# Patient Record
Sex: MALE | Race: WHITE | Employment: FULL TIME | URBAN - METROPOLITAN AREA
[De-identification: names, ages, dates, MRNs, and addresses within clinical notes are randomized per-mention and may not be internally consistent; named-entity substitution may affect disease eponyms.]

---

## 2019-05-01 ENCOUNTER — HOSPITAL ENCOUNTER (OUTPATIENT)
Dept: PREADMISSION TESTING | Age: 64
Discharge: HOME | End: 2019-05-01
Payer: COMMERCIAL

## 2019-05-01 VITALS
HEART RATE: 48 BPM | HEIGHT: 68 IN | DIASTOLIC BLOOD PRESSURE: 81 MMHG | TEMPERATURE: 97.5 F | WEIGHT: 160 LBS | BODY MASS INDEX: 24.25 KG/M2 | SYSTOLIC BLOOD PRESSURE: 125 MMHG

## 2019-05-01 LAB
ATRIAL RATE: 47 BPM
CALCULATED P AXIS, ECG09: 50 DEGREES
CALCULATED R AXIS, ECG10: 67 DEGREES
CALCULATED T AXIS, ECG11: 46 DEGREES
DIAGNOSIS, 93000: NORMAL
HGB BLD-MCNC: 13.9 G/DL (ref 12.1–17)
P-R INTERVAL, ECG05: 164 MS
Q-T INTERVAL, ECG07: 458 MS
QRS DURATION, ECG06: 86 MS
QTC CALCULATION (BEZET), ECG08: 405 MS
VENTRICULAR RATE, ECG03: 47 BPM

## 2019-05-01 PROCEDURE — 93005 ELECTROCARDIOGRAM TRACING: CPT

## 2019-05-01 PROCEDURE — 36415 COLL VENOUS BLD VENIPUNCTURE: CPT

## 2019-05-01 PROCEDURE — 85018 HEMOGLOBIN: CPT

## 2019-05-01 RX ORDER — HYDROCORTISONE ACETATE 1 %
CREAM (GRAM) TOPICAL DAILY
COMMUNITY

## 2019-05-01 RX ORDER — BISMUTH SUBSALICYLATE 262 MG
1 TABLET,CHEWABLE ORAL DAILY
COMMUNITY

## 2019-05-01 RX ORDER — CHOLECALCIFEROL (VITAMIN D3) 125 MCG
CAPSULE ORAL AS NEEDED
COMMUNITY

## 2019-05-01 NOTE — PERIOP NOTES
PREOPERATIVE INSTRUCTIONS REVIEWED WITH PATIENT. PATIENT GIVEN TWO-- SIX PACKS OF CHG WIPES. INSTRUCTIONS REVIEWED ON USE OF CHG WIPES. PATIENT GIVEN SSI INFECTIONS SHEET. PATIENT WAS GIVEN THE OPPORTUNITY TO ASK QUESTIONS ON THE INFORMATION PROVIDED. PATIENT ADVISED THAT THEY MAY DRINK CLEAR LIQUIDS (12 OZ/4 HOURS) UP UNTIL ONE HOUR PRIOR TO ARRIVAL DAY OF SURGERY.

## 2019-05-03 PROBLEM — S46.011A TRAUMATIC COMPLETE TEAR OF RIGHT ROTATOR CUFF: Status: ACTIVE | Noted: 2019-05-03

## 2019-05-03 NOTE — H&P
Chief Complaint: Follow-up of the Right Shoulder 
  
Jennifer Manjarrez comes in to the clinic today for an follow-up of his right shoulder. Patient had a fall while running where he attempted to catch himself resulting in him landing directly on his right shoulder, which occurred roughly 5-6 weeks ago. He was initially seen at [facility] for [facility] and he was evaluated to have shoulder sprain and a rotator cuff tendinitis. He was placed in to an arm sling and advise to work on home conservative measures. On follow-up evaluation he persisted progressing pain and noticeable weakness to the right shoulder. We are concerned for a rotator cuff tear so we sent him for a Mri. He is here today to review his results. He continues to have quite severe pain to the shoulder. He continues to have quite severe pain to the shoulder. No recent injuries were noted. 
  
  
Review of Systems 4/30/2019 
  
Constitutional: Unexplained: Negative Genitourinary: Frequent Urination: Negative HEENT: Vision Loss: Negative Neurological: Memory Loss: Negative Integumentary: Rash: Negative Cardiovascular: Palpatations: Negative Hematologic: Bruises/Bleeds Easily: Negative Gastrointestinal: Constipation: Negative Immunological: Seasonal Allergies: Positive Musculoskeletal: Joint Pain: Positive 
  
Medical History Past Medical History:  
Diagnosis Date  no relevant past medical history    
  
  
  
Surgical History Past Surgical History:  
Procedure Laterality Date  NO RELEVANT ORTHOPAEDIC SURGERIES      
 NO RELEVANT SURGERIES      
  
  
  
Objective:  
  
There were no vitals filed for this visit. 
  
Constitutional:  No acute distress. Well nourished. Well developed. Eyes:  Sclera are nonicteric. Respiratory:  No labored breathing. Cardiovascular:  No marked edema. Skin:  No marked skin ulcers. Neurological:  No marked sensory loss noted. Psychiatric: Alert and oriented x3.  
Musculoskeletal  
  
 On exam of the right shoulder reveals elevation to 158 degrees with marked crepitus and tenderness to the subacromial space. Weakness to external rotation. No neck pain. No numbness or paresthesias in the hands. No effusion. No sign of infection. No cellulitis or rash. I detect no motor or sensory deficits in the upper extremities. The neurovascular status of the upper extremities is intact. 
  
  
Imaging/Studies:  
I personally review the Mri of the right shoulder taken on 4/27/19.  
  
Impression: 1. Full-thickness mildly retracted supraspinatus tear. No fatty muscle 
atrophy. 2. Completely torn and retracted subscapularis tear. No fatty muscle 
atrophy. 3. Infraspinatus tendinopathy and small laminar intrasubstance tear. 4. Thickened and medially subluxed long head of the biceps tendon. 5. Moderate glenohumeral osteoarthritis. 6. Moderate acromioclavicular degenerative changes. 7. Moderate subacromial/subdeltoid bursitis. 
  
Assessment:  
There is no problem list on file for this patient. 
  
  
    ICD-10-CM 1. Complete tear of right rotator cuff M75.121  
2. Tear of right biceps muscle, initial encounter I86.702K 3. 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  
  
  
  
Plan: I reviewed my findings and the Mri with the patient and wife. The Mri revealed a complete tear of the rotator cuff with retraction. It also revealed biceps pathology with underlying chondromalacia. I showed the patient and wife a poster diagram to review the pathophysiology.  
  
We discussed treatment options. Overall, the patient has failed conservative measures. From the findings of his Mri and physical exam, I explained that the only treatment modality that would provide him long term symptomatic relief would be surgical intervention. I explained that he would right shoulder arthroscopy with subacromial decompression.  We would evaluate the rotator cuff and he would most likely require mini open repair. I explained that we would also evaluate the biceps tendon and that he may require biceps tenotomy and/or tenodesis. He was in agreement with the proposed plan.  
  
I explained the procedure of arthroscopy and mini open rotator cuff repair. I explained the hospitalization, post-op and rehabilitation for the procedure. The patient is aware of all complications associated with the surgery, including the chance of infection and recurrent tear. He understands that he would be on antibiotics following surgery to prevent infection and that he would undergo a course of post-op physical therapy for rehabilitation. He understands the period of immobilization following rotator cuff repair and the extent of physical therapy following the operation. PROCEDURE:  Right shoulder arthroscopy, subacromial decompression, mini open rotator cuff repair and biceps tenotomy versus tenodesis. Date of Surgery Update: 
Andres Coates was seen and examined. Past Medical History:  
Diagnosis Date  Adverse effect of anesthesia   
 difficulty urinating postop  H/O seasonal allergies  Nausea & vomiting Prior to Admission Medications Prescriptions Last Dose Informant Patient Reported? Taking? OTHER   Yes No  
Sig: CBD OIL; AT BEDTIME AS NEEDED; FOR SHOULDER PAIN. acetaminophen (TYLENOL EXTRA STRENGTH PO)   Yes No  
Sig: Take  by mouth as needed. multivitamin (ONE A DAY) tablet   Yes No  
Sig: Take 1 Tab by mouth daily. naproxen sodium (ALEVE) 220 mg cap   Yes No  
Sig: Take  by mouth as needed. omega-3 fatty acids (FISH OIL CONCENTRATE PO)   Yes No  
Sig: Take 1,200 Units by mouth three (3) times daily. vitamin e (AQUA GEMS) 200 unit capsule   Yes No  
Sig: Take  by mouth daily. Facility-Administered Medications: None Allergy to: Ibuprofen Physical Examination: General appearance - alert, well appearing, and in no distress History and physical has been reviewed. The patient has been examined. There have been no significant clinical changes since the completion of the originally dated History and Physical. 
 
Signed By: Melissa Lewis PA-C  May 8, 2019 7:26 AM

## 2019-05-03 NOTE — PERIOP NOTES
ABN. EKG REVIEWED BY DR WHITT, ANESTH.; OKAY FOR SURGERY. FAXED AND CALLED/ SPOKE WITH [facility] -- DR GREEN/OFFICE.

## 2019-05-08 ENCOUNTER — ANESTHESIA EVENT (OUTPATIENT)
Dept: SURGERY | Age: 64
End: 2019-05-08
Payer: COMMERCIAL

## 2019-05-08 ENCOUNTER — HOSPITAL ENCOUNTER (OUTPATIENT)
Age: 64
Setting detail: OUTPATIENT SURGERY
Discharge: HOME | End: 2019-05-08
Attending: ORTHOPAEDIC SURGERY | Admitting: ORTHOPAEDIC SURGERY
Payer: COMMERCIAL

## 2019-05-08 ENCOUNTER — ANESTHESIA (OUTPATIENT)
Dept: SURGERY | Age: 64
End: 2019-05-08
Payer: COMMERCIAL

## 2019-05-08 VITALS
HEIGHT: 68 IN | TEMPERATURE: 98.5 F | SYSTOLIC BLOOD PRESSURE: 122 MMHG | BODY MASS INDEX: 24.25 KG/M2 | WEIGHT: 160 LBS | DIASTOLIC BLOOD PRESSURE: 62 MMHG | HEART RATE: 68 BPM | RESPIRATION RATE: 16 BRPM | OXYGEN SATURATION: 99 %

## 2019-05-08 DIAGNOSIS — S46.011A TRAUMATIC COMPLETE TEAR OF RIGHT ROTATOR CUFF, INITIAL ENCOUNTER: Primary | ICD-10-CM

## 2019-05-08 LAB
GLUCOSE BLD STRIP.AUTO-MCNC: 94 MG/DL (ref 65–100)
SERVICE CMNT-IMP: NORMAL

## 2019-05-08 PROCEDURE — 77030008684 HC TU ET CUF COVD -B: Performed by: ANESTHESIOLOGY

## 2019-05-08 PROCEDURE — 77030031139 HC SUT VCRL2 J&J -A: Performed by: ORTHOPAEDIC SURGERY

## 2019-05-08 PROCEDURE — 77030011263 HC ELECTRD ACRPLSTY CNMD -B: Performed by: ORTHOPAEDIC SURGERY

## 2019-05-08 PROCEDURE — 74011250636 HC RX REV CODE- 250/636

## 2019-05-08 PROCEDURE — 77030019908 HC STETH ESOPH SIMS -A: Performed by: ANESTHESIOLOGY

## 2019-05-08 PROCEDURE — C1713 ANCHOR/SCREW BN/BN,TIS/BN: HCPCS | Performed by: ORTHOPAEDIC SURGERY

## 2019-05-08 PROCEDURE — 77030011640 HC PAD GRND REM COVD -A: Performed by: ORTHOPAEDIC SURGERY

## 2019-05-08 PROCEDURE — 77030018835 HC SOL IRR LR ICUM -A: Performed by: ORTHOPAEDIC SURGERY

## 2019-05-08 PROCEDURE — 77030003598 HC NDL MULT/FIRE ARTH -C: Performed by: ORTHOPAEDIC SURGERY

## 2019-05-08 PROCEDURE — 76210000026 HC REC RM PH II 1 TO 1.5 HR: Performed by: ORTHOPAEDIC SURGERY

## 2019-05-08 PROCEDURE — 77030008753 HC TU IRR IN/OUT FLO S&N -B: Performed by: ORTHOPAEDIC SURGERY

## 2019-05-08 PROCEDURE — 77030003601 HC NDL NRV BLK BBMI -A

## 2019-05-08 PROCEDURE — 77030006884 HC BLD SHV INCIS S&N -B: Performed by: ORTHOPAEDIC SURGERY

## 2019-05-08 PROCEDURE — 74011250636 HC RX REV CODE- 250/636: Performed by: PHYSICIAN ASSISTANT

## 2019-05-08 PROCEDURE — 74011250636 HC RX REV CODE- 250/636: Performed by: ORTHOPAEDIC SURGERY

## 2019-05-08 PROCEDURE — 74011250637 HC RX REV CODE- 250/637

## 2019-05-08 PROCEDURE — 77030002986 HC SUT PROL J&J -A: Performed by: ORTHOPAEDIC SURGERY

## 2019-05-08 PROCEDURE — 77030004451 HC BUR SHV S&N -B: Performed by: ORTHOPAEDIC SURGERY

## 2019-05-08 PROCEDURE — 76210000016 HC OR PH I REC 1 TO 1.5 HR: Performed by: ORTHOPAEDIC SURGERY

## 2019-05-08 PROCEDURE — 76060000038 HC ANESTHESIA 3.5 TO 4 HR: Performed by: ORTHOPAEDIC SURGERY

## 2019-05-08 PROCEDURE — 77030020782 HC GWN BAIR PAWS FLX 3M -B

## 2019-05-08 PROCEDURE — 77030018788 HC NDL SUT ANCH -A: Performed by: ORTHOPAEDIC SURGERY

## 2019-05-08 PROCEDURE — 76010000134 HC OR TIME 3.5 TO 4 HR: Performed by: ORTHOPAEDIC SURGERY

## 2019-05-08 PROCEDURE — 74011000250 HC RX REV CODE- 250

## 2019-05-08 PROCEDURE — 77030002919 HC SUT FBRTAPE ARTH -B: Performed by: ORTHOPAEDIC SURGERY

## 2019-05-08 PROCEDURE — 82962 GLUCOSE BLOOD TEST: CPT

## 2019-05-08 PROCEDURE — 77030026438 HC STYL ET INTUB CARD -A: Performed by: ANESTHESIOLOGY

## 2019-05-08 PROCEDURE — 74011250636 HC RX REV CODE- 250/636: Performed by: ANESTHESIOLOGY

## 2019-05-08 PROCEDURE — 64415 NJX AA&/STRD BRCH PLXS IMG: CPT | Performed by: ANESTHESIOLOGY

## 2019-05-08 PROCEDURE — 77030002933 HC SUT MCRYL J&J -A: Performed by: ORTHOPAEDIC SURGERY

## 2019-05-08 PROCEDURE — 77030032490 HC SLV COMPR SCD KNE COVD -B: Performed by: ORTHOPAEDIC SURGERY

## 2019-05-08 PROCEDURE — 74011250637 HC RX REV CODE- 250/637: Performed by: ANESTHESIOLOGY

## 2019-05-08 DEVICE — ANCHOR SUTURE BIOCOMP 4.75X19.1 MM SWIVELOCK C: Type: IMPLANTABLE DEVICE | Site: SHOULDER | Status: FUNCTIONAL

## 2019-05-08 RX ORDER — MIDAZOLAM HYDROCHLORIDE 1 MG/ML
1 INJECTION, SOLUTION INTRAMUSCULAR; INTRAVENOUS AS NEEDED
Status: DISCONTINUED | OUTPATIENT
Start: 2019-05-08 | End: 2019-05-08 | Stop reason: HOSPADM

## 2019-05-08 RX ORDER — PROPOFOL 10 MG/ML
INJECTION, EMULSION INTRAVENOUS AS NEEDED
Status: DISCONTINUED | OUTPATIENT
Start: 2019-05-08 | End: 2019-05-08 | Stop reason: HOSPADM

## 2019-05-08 RX ORDER — MIDAZOLAM HYDROCHLORIDE 1 MG/ML
0.5 INJECTION, SOLUTION INTRAMUSCULAR; INTRAVENOUS
Status: CANCELLED | OUTPATIENT
Start: 2019-05-08

## 2019-05-08 RX ORDER — SODIUM CHLORIDE 0.9 % (FLUSH) 0.9 %
5-40 SYRINGE (ML) INJECTION EVERY 8 HOURS
Status: CANCELLED | OUTPATIENT
Start: 2019-05-08

## 2019-05-08 RX ORDER — HYDROMORPHONE HYDROCHLORIDE 2 MG/1
2-4 TABLET ORAL
Qty: 36 TAB | Refills: 0 | Status: SHIPPED | OUTPATIENT
Start: 2019-05-08 | End: 2019-05-15

## 2019-05-08 RX ORDER — LIDOCAINE HYDROCHLORIDE 10 MG/ML
0.1 INJECTION, SOLUTION EPIDURAL; INFILTRATION; INTRACAUDAL; PERINEURAL AS NEEDED
Status: DISCONTINUED | OUTPATIENT
Start: 2019-05-08 | End: 2019-05-08 | Stop reason: HOSPADM

## 2019-05-08 RX ORDER — SODIUM CHLORIDE 0.9 % (FLUSH) 0.9 %
5-40 SYRINGE (ML) INJECTION AS NEEDED
Status: CANCELLED | OUTPATIENT
Start: 2019-05-08

## 2019-05-08 RX ORDER — MIDAZOLAM HYDROCHLORIDE 1 MG/ML
INJECTION, SOLUTION INTRAMUSCULAR; INTRAVENOUS AS NEEDED
Status: DISCONTINUED | OUTPATIENT
Start: 2019-05-08 | End: 2019-05-08 | Stop reason: HOSPADM

## 2019-05-08 RX ORDER — LIDOCAINE HYDROCHLORIDE 20 MG/ML
INJECTION, SOLUTION EPIDURAL; INFILTRATION; INTRACAUDAL; PERINEURAL AS NEEDED
Status: DISCONTINUED | OUTPATIENT
Start: 2019-05-08 | End: 2019-05-08 | Stop reason: HOSPADM

## 2019-05-08 RX ORDER — SODIUM CHLORIDE, SODIUM LACTATE, POTASSIUM CHLORIDE, CALCIUM CHLORIDE 600; 310; 30; 20 MG/100ML; MG/100ML; MG/100ML; MG/100ML
INJECTION, SOLUTION INTRAVENOUS
Status: DISCONTINUED | OUTPATIENT
Start: 2019-05-08 | End: 2019-05-08 | Stop reason: HOSPADM

## 2019-05-08 RX ORDER — ROPIVACAINE HYDROCHLORIDE 2 MG/ML
INJECTION, SOLUTION EPIDURAL; INFILTRATION; PERINEURAL
Status: COMPLETED | OUTPATIENT
Start: 2019-05-08 | End: 2019-05-08

## 2019-05-08 RX ORDER — ONDANSETRON 2 MG/ML
4 INJECTION INTRAMUSCULAR; INTRAVENOUS AS NEEDED
Status: CANCELLED | OUTPATIENT
Start: 2019-05-08

## 2019-05-08 RX ORDER — DIPHENHYDRAMINE HYDROCHLORIDE 50 MG/ML
12.5 INJECTION, SOLUTION INTRAMUSCULAR; INTRAVENOUS AS NEEDED
Status: CANCELLED | OUTPATIENT
Start: 2019-05-08 | End: 2019-05-08

## 2019-05-08 RX ORDER — SODIUM CHLORIDE 0.9 % (FLUSH) 0.9 %
5-40 SYRINGE (ML) INJECTION EVERY 8 HOURS
Status: DISCONTINUED | OUTPATIENT
Start: 2019-05-08 | End: 2019-05-08 | Stop reason: HOSPADM

## 2019-05-08 RX ORDER — MORPHINE SULFATE 10 MG/ML
2 INJECTION, SOLUTION INTRAMUSCULAR; INTRAVENOUS
Status: CANCELLED | OUTPATIENT
Start: 2019-05-08

## 2019-05-08 RX ORDER — ACETAMINOPHEN 325 MG/1
650 TABLET ORAL ONCE
Status: COMPLETED | OUTPATIENT
Start: 2019-05-08 | End: 2019-05-08

## 2019-05-08 RX ORDER — PHENYLEPHRINE HCL IN 0.9% NACL 0.4MG/10ML
SYRINGE (ML) INTRAVENOUS AS NEEDED
Status: DISCONTINUED | OUTPATIENT
Start: 2019-05-08 | End: 2019-05-08 | Stop reason: HOSPADM

## 2019-05-08 RX ORDER — GLYCOPYRROLATE 0.2 MG/ML
INJECTION INTRAMUSCULAR; INTRAVENOUS AS NEEDED
Status: DISCONTINUED | OUTPATIENT
Start: 2019-05-08 | End: 2019-05-08 | Stop reason: HOSPADM

## 2019-05-08 RX ORDER — FENTANYL CITRATE 50 UG/ML
50 INJECTION, SOLUTION INTRAMUSCULAR; INTRAVENOUS AS NEEDED
Status: DISCONTINUED | OUTPATIENT
Start: 2019-05-08 | End: 2019-05-08 | Stop reason: HOSPADM

## 2019-05-08 RX ORDER — SODIUM CHLORIDE 9 MG/ML
25 INJECTION, SOLUTION INTRAVENOUS CONTINUOUS
Status: DISCONTINUED | OUTPATIENT
Start: 2019-05-08 | End: 2019-05-08 | Stop reason: HOSPADM

## 2019-05-08 RX ORDER — CEFAZOLIN SODIUM/WATER 2 G/20 ML
2 SYRINGE (ML) INTRAVENOUS
Status: COMPLETED | OUTPATIENT
Start: 2019-05-08 | End: 2019-05-08

## 2019-05-08 RX ORDER — FENTANYL CITRATE 50 UG/ML
INJECTION, SOLUTION INTRAMUSCULAR; INTRAVENOUS AS NEEDED
Status: DISCONTINUED | OUTPATIENT
Start: 2019-05-08 | End: 2019-05-08 | Stop reason: HOSPADM

## 2019-05-08 RX ORDER — FENTANYL CITRATE 50 UG/ML
25 INJECTION, SOLUTION INTRAMUSCULAR; INTRAVENOUS
Status: CANCELLED | OUTPATIENT
Start: 2019-05-08

## 2019-05-08 RX ORDER — HYDROMORPHONE HYDROCHLORIDE 1 MG/ML
0.5 INJECTION, SOLUTION INTRAMUSCULAR; INTRAVENOUS; SUBCUTANEOUS
Status: CANCELLED | OUTPATIENT
Start: 2019-05-08

## 2019-05-08 RX ORDER — NEOSTIGMINE METHYLSULFATE 1 MG/ML
INJECTION INTRAVENOUS AS NEEDED
Status: DISCONTINUED | OUTPATIENT
Start: 2019-05-08 | End: 2019-05-08 | Stop reason: HOSPADM

## 2019-05-08 RX ORDER — ROPIVACAINE HYDROCHLORIDE 5 MG/ML
30 INJECTION, SOLUTION EPIDURAL; INFILTRATION; PERINEURAL AS NEEDED
Status: DISCONTINUED | OUTPATIENT
Start: 2019-05-08 | End: 2019-05-08 | Stop reason: HOSPADM

## 2019-05-08 RX ORDER — EPHEDRINE SULFATE/0.9% NACL/PF 50 MG/5 ML
SYRINGE (ML) INTRAVENOUS AS NEEDED
Status: DISCONTINUED | OUTPATIENT
Start: 2019-05-08 | End: 2019-05-08 | Stop reason: HOSPADM

## 2019-05-08 RX ORDER — SODIUM CHLORIDE 0.9 % (FLUSH) 0.9 %
5-40 SYRINGE (ML) INJECTION AS NEEDED
Status: DISCONTINUED | OUTPATIENT
Start: 2019-05-08 | End: 2019-05-08 | Stop reason: HOSPADM

## 2019-05-08 RX ORDER — SUCCINYLCHOLINE CHLORIDE 20 MG/ML
INJECTION INTRAMUSCULAR; INTRAVENOUS AS NEEDED
Status: DISCONTINUED | OUTPATIENT
Start: 2019-05-08 | End: 2019-05-08 | Stop reason: HOSPADM

## 2019-05-08 RX ORDER — SCOLOPAMINE TRANSDERMAL SYSTEM 1 MG/1
PATCH, EXTENDED RELEASE TRANSDERMAL AS NEEDED
Status: DISCONTINUED | OUTPATIENT
Start: 2019-05-08 | End: 2019-05-08 | Stop reason: HOSPADM

## 2019-05-08 RX ORDER — ONDANSETRON 2 MG/ML
INJECTION INTRAMUSCULAR; INTRAVENOUS AS NEEDED
Status: DISCONTINUED | OUTPATIENT
Start: 2019-05-08 | End: 2019-05-08 | Stop reason: HOSPADM

## 2019-05-08 RX ORDER — SODIUM CHLORIDE, SODIUM LACTATE, POTASSIUM CHLORIDE, CALCIUM CHLORIDE 600; 310; 30; 20 MG/100ML; MG/100ML; MG/100ML; MG/100ML
100 INJECTION, SOLUTION INTRAVENOUS CONTINUOUS
Status: CANCELLED | OUTPATIENT
Start: 2019-05-08

## 2019-05-08 RX ORDER — ROCURONIUM BROMIDE 10 MG/ML
INJECTION, SOLUTION INTRAVENOUS AS NEEDED
Status: DISCONTINUED | OUTPATIENT
Start: 2019-05-08 | End: 2019-05-08 | Stop reason: HOSPADM

## 2019-05-08 RX ORDER — SODIUM CHLORIDE, SODIUM LACTATE, POTASSIUM CHLORIDE, CALCIUM CHLORIDE 600; 310; 30; 20 MG/100ML; MG/100ML; MG/100ML; MG/100ML
100 INJECTION, SOLUTION INTRAVENOUS CONTINUOUS
Status: DISCONTINUED | OUTPATIENT
Start: 2019-05-08 | End: 2019-05-08 | Stop reason: HOSPADM

## 2019-05-08 RX ORDER — DEXAMETHASONE SODIUM PHOSPHATE 4 MG/ML
INJECTION, SOLUTION INTRA-ARTICULAR; INTRALESIONAL; INTRAMUSCULAR; INTRAVENOUS; SOFT TISSUE AS NEEDED
Status: DISCONTINUED | OUTPATIENT
Start: 2019-05-08 | End: 2019-05-08 | Stop reason: HOSPADM

## 2019-05-08 RX ADMIN — Medication 10 MG: at 09:26

## 2019-05-08 RX ADMIN — LIDOCAINE HYDROCHLORIDE 60 MG: 20 INJECTION, SOLUTION EPIDURAL; INFILTRATION; INTRACAUDAL; PERINEURAL at 09:15

## 2019-05-08 RX ADMIN — FENTANYL CITRATE 50 MCG: 50 INJECTION, SOLUTION INTRAMUSCULAR; INTRAVENOUS at 12:15

## 2019-05-08 RX ADMIN — ROCURONIUM BROMIDE 15 MG: 10 INJECTION, SOLUTION INTRAVENOUS at 09:23

## 2019-05-08 RX ADMIN — SODIUM CHLORIDE, SODIUM LACTATE, POTASSIUM CHLORIDE, CALCIUM CHLORIDE: 600; 310; 30; 20 INJECTION, SOLUTION INTRAVENOUS at 09:03

## 2019-05-08 RX ADMIN — ONDANSETRON 4 MG: 2 INJECTION INTRAMUSCULAR; INTRAVENOUS at 11:57

## 2019-05-08 RX ADMIN — FENTANYL CITRATE 50 MCG: 50 INJECTION, SOLUTION INTRAMUSCULAR; INTRAVENOUS at 08:30

## 2019-05-08 RX ADMIN — GLYCOPYRROLATE 2 MG: 0.2 INJECTION INTRAMUSCULAR; INTRAVENOUS at 11:57

## 2019-05-08 RX ADMIN — PROPOFOL 30 MG: 10 INJECTION, EMULSION INTRAVENOUS at 12:14

## 2019-05-08 RX ADMIN — MIDAZOLAM HYDROCHLORIDE 2 MG: 1 INJECTION, SOLUTION INTRAMUSCULAR; INTRAVENOUS at 08:30

## 2019-05-08 RX ADMIN — ACETAMINOPHEN 650 MG: 325 TABLET ORAL at 08:16

## 2019-05-08 RX ADMIN — MIDAZOLAM HYDROCHLORIDE 2 MG: 1 INJECTION, SOLUTION INTRAMUSCULAR; INTRAVENOUS at 09:07

## 2019-05-08 RX ADMIN — GLYCOPYRROLATE 0.2 MG: 0.2 INJECTION INTRAMUSCULAR; INTRAVENOUS at 09:50

## 2019-05-08 RX ADMIN — SUCCINYLCHOLINE CHLORIDE 140 MG: 20 INJECTION INTRAMUSCULAR; INTRAVENOUS at 09:15

## 2019-05-08 RX ADMIN — ROCURONIUM BROMIDE 5 MG: 10 INJECTION, SOLUTION INTRAVENOUS at 09:15

## 2019-05-08 RX ADMIN — Medication 80 MCG: at 09:41

## 2019-05-08 RX ADMIN — FENTANYL CITRATE 50 MCG: 50 INJECTION, SOLUTION INTRAMUSCULAR; INTRAVENOUS at 12:14

## 2019-05-08 RX ADMIN — ONDANSETRON 4 MG: 2 INJECTION INTRAMUSCULAR; INTRAVENOUS at 10:10

## 2019-05-08 RX ADMIN — SCOLOPAMINE TRANSDERMAL SYSTEM 1 PATCH: 1 PATCH, EXTENDED RELEASE TRANSDERMAL at 09:07

## 2019-05-08 RX ADMIN — DEXAMETHASONE SODIUM PHOSPHATE 8 MG: 4 INJECTION, SOLUTION INTRA-ARTICULAR; INTRALESIONAL; INTRAMUSCULAR; INTRAVENOUS; SOFT TISSUE at 10:10

## 2019-05-08 RX ADMIN — SODIUM CHLORIDE, SODIUM LACTATE, POTASSIUM CHLORIDE, AND CALCIUM CHLORIDE 100 ML/HR: 600; 310; 30; 20 INJECTION, SOLUTION INTRAVENOUS at 08:16

## 2019-05-08 RX ADMIN — Medication 2 G: at 09:28

## 2019-05-08 RX ADMIN — NEOSTIGMINE METHYLSULFATE 2 MG: 1 INJECTION INTRAVENOUS at 11:57

## 2019-05-08 RX ADMIN — ROPIVACAINE HYDROCHLORIDE 40 MG: 2 INJECTION, SOLUTION EPIDURAL; INFILTRATION; PERINEURAL at 08:53

## 2019-05-08 RX ADMIN — FENTANYL CITRATE 100 MCG: 50 INJECTION, SOLUTION INTRAMUSCULAR; INTRAVENOUS at 09:15

## 2019-05-08 RX ADMIN — Medication 10 MG: at 09:21

## 2019-05-08 RX ADMIN — PROPOFOL 200 MG: 10 INJECTION, EMULSION INTRAVENOUS at 09:15

## 2019-05-08 NOTE — ANESTHESIA PREPROCEDURE EVALUATION
Relevant Problems No relevant active problems Anesthetic History No history of anesthetic complications PONV Review of Systems / Medical History Patient summary reviewed, nursing notes reviewed and pertinent labs reviewed Pulmonary Within defined limits Neuro/Psych Within defined limits Cardiovascular Within defined limits GI/Hepatic/Renal 
Within defined limits Endo/Other Within defined limits Other Findings Physical Exam 
 
Airway Mallampati: II 
TM Distance: > 6 cm Neck ROM: normal range of motion Mouth opening: Normal 
 
 Cardiovascular Regular rate and rhythm,  S1 and S2 normal,  no murmur, click, rub, or gallop Dental 
No notable dental hx Pulmonary Breath sounds clear to auscultation Abdominal 
GI exam deferred Other Findings Anesthetic Plan ASA: 2 Anesthesia type: general 
 
 
Post-op pain plan if not by surgeon: peripheral nerve block single Induction: Intravenous Anesthetic plan and risks discussed with: Patient

## 2019-05-08 NOTE — ANESTHESIA PROCEDURE NOTES
Peripheral Block    Start time: 5/8/2019 8:34 AM  End time: 5/8/2019 8:41 AM  Performed by: Bj Jones MD  Authorized by: Bj Jones MD       Pre-procedure:    Indications: at surgeon's request and post-op pain management    Preanesthetic Checklist: patient identified, risks and benefits discussed, site marked, timeout performed, anesthesia consent given and patient being monitored      Block Type:   Block Type:  Supraclavicular  Laterality:  Right  Monitoring:  Standard ASA monitoring, continuous pulse ox, frequent vital sign checks, heart rate, responsive to questions and oxygen  Injection Technique:  Single shot  Procedures: ultrasound guided and nerve stimulator    Patient Position: supine  Prep: alcohol    Location:  Supraclavicular  Needle Type:  Stimuplex  Needle Gauge:  22 G  Needle Localization:  Nerve stimulator and ultrasound guidance  Motor Response: minimal motor response >0.4 mA      Assessment:  Number of attempts:  1  Injection Assessment:  Incremental injection every 5 mL, local visualized surrounding nerve on ultrasound, negative aspiration for blood, no intravascular symptoms, negative aspiration for CSF, no paresthesia and ultrasound image on chart  Patient tolerance:  Patient tolerated the procedure well with no immediate complications

## 2019-05-08 NOTE — BRIEF OP NOTE
BRIEF OPERATIVE NOTE Date of Procedure: 5/8/2019 Preoperative Diagnosis: RIGHT ROTATOR CUFF TEAR Postoperative Diagnosis: RIGHT SHOULDER ROTATOR CUFF TEAR Procedure(s): RIGHT SHOULDER ARTHROSCOPY WITH DECOMPRESSION, MINI OPEN ROTATOR CUFF TEAR REPAIR AND BICEPS TENODESIS Surgeon(s) and Role: * Dane Perez MD - Primary Surgical Assistant: Mia Saul PA-C Surgical Staff: 
Circ-1: Smiley Galvan RN 
Circ-Relief: Chantel Landers RN Physician Assistant: Hazel Luna PA-C Scrub Tech-1: Sandee Lorenzana Scrub RN-Relief: Ramesh Grady RN Surg Asst-1: Terry Briseno Event Time In Time Out Incision Start 0140 Incision Close 1228 Anesthesia: General  
Estimated Blood Loss: 50 mL Specimens: * No specimens in log * Findings: Right massive rotator cuff tear Complications: None. Implants:  
Implant Name Type Inv. Item Serial No.  Lot No. LRB No. Used Action ANCHOR BIOCOMP [id number] -- [facility] C-VENT - SN/A [place] ANCHOR BIOCOMP [id number] -- [employer] C-VENT N/A [employer] [id number] Right 5 Implanted

## 2019-05-08 NOTE — DISCHARGE INSTRUCTIONS
DISCHARGE SUMMARY from Nurse    The following personal items collected during your admission are returned to you:   Dental Appliance: Dental Appliances: None  Vision: Visual Aid: Glasses(glasses to pacu)  Hearing Aid:    Jewelry: Jewelry: Ring, With patient(left wedding band in place)  Clothing: Clothing: Footwear, Pants, Shirt(clothes to pacu)  Other Valuables:    Valuables sent to safe: ALL CLOTHING/VALUABLES RETURNED TO PATIENT BEFORE D/C HOME    PATIENT INSTRUCTIONS:    The first meal should be something that is light; not greasy or spicy. If this is tolerated, then advance to regular diet as tolerated. After general anesthesia or intravenous sedation, for 24 hours or while taking prescription Narcotics:  · Limit your activities  · Do not drive and operate hazardous machinery  · Do not make important personal or business decisions  · Do  not drink alcoholic beverages  If you have not urinated within 8 hours after discharge, please contact your surgeon on call.         Pain  · Take pain medication as directed by your doctor  ·  Call your doctor if pain is NOT relieved by medication  · DO NOT take aspirin or blood thinners until directed by your doctor    Report the following to your surgeon:  · Excessive pain, swelling, redness or odor of or around the surgical area  · Temperature over 100.5  · Nausea and vomiting lasting longer than 4 hours or if unable to take medications  · Any signs of decreased circulation or nerve impairment to extremity: change in color, persistent  numbness, tingling, coldness or increase pain  · Any questions    ALSO:  FOLLOW ANY INSTRUCTIONS SPECIFIED BY YOUR SURGEON       Follow-up Appointments   Procedures    FOLLOW UP VISIT Appointment in: Ten Days     Standing Status:   Standing     Number of Occurrences:   1     Order Specific Question:   Appointment in     Answer:   Ten Days         Follow-Up Phone Calls  · Are usually made nursing staff, the day after your surgery  · If you have any problems, call your doctor as needed      The discharge information has been reviewed with the patient and family. The patient and family verbalized understanding. Discharge medications reviewed with the patient and family and appropriate educational materials and side effects teaching were provided. *  Please give a list of your current medications to your Primary Care Provider. *  Please update this list whenever your medications are discontinued, doses are      changed, or new medications (including over-the-counter products) are added. *  Please carry medication information at all times in case of emergency situations. [facility]    POST-OPERATIVE INSTRUCTIONS  Rotator Cuff Repair - Shoulder    5. First meal at home should be clear liquids. Progress to regular diet as tolerated. 6. Apply an ice bag or use cold therapy device for 20-30 minutes 4 times a day for the first 48-72 hours to reduce swelling and pain and then use as desired. 7. You may remove the bulky dressing 24 hours after surgery and at that time it is ok to shower. If there are steri-strips, please DO NOT remove them until seen in the office; otherwise, apply band-aids over the small incisions and change daily after showers. 8. You will use a sling with a pillow until your first post-op visit. You can remove the sling to shower and to let your arm hang down by your side. 9. You may perform pendulum arm exercises that you may begin 1-2 days after surgery. Remove your sling to do these exercises several times a day. We will discuss therapy at your first post-op visit. 10. A recliner position is often more comfortable for sleeping the first several days/weeks after surgery. 11. A prescription for pain medication will be provided before you are discharged home. Please take 1 aspirin a day for one week after surgery, beginning tomorrow.     12. A post-op appointment should be scheduled for ten days post-op, earlier as needed. Please call the office to schedule your appointment time ([phone]). 13. If you develop a fever greater than 101, unexpected redness or swelling in your arm, please call the office immediately. Some bleeding and or drainage can be expected the first few days after surgery. Thank you for following the above instructions. If you have any questions, please call the office ([phone]).

## 2019-05-08 NOTE — ANESTHESIA POSTPROCEDURE EVALUATION
Procedure(s): RIGHT SHOULDER ARTHROSCOPY WITH DECOMPRESSION, MINI OPEN ROTATOR CUFF TEAR REPAIR AND BICEPS TENODESIS. general 
 
<BSHSIANPOST> Vitals Value Taken Time /60 5/8/2019  1:45 PM  
Temp 36.6 °C (97.9 °F) 5/8/2019 12:41 PM  
Pulse 65 5/8/2019  1:46 PM  
Resp 10 5/8/2019  1:46 PM  
SpO2 98 % 5/8/2019  1:46 PM  
Vitals shown include unvalidated device data.

## 2019-05-08 NOTE — ROUTINE PROCESS
Patient: Mellissa Stockton MRN: [id number]  SSN: [id number] YOB: 1955  Age: 70 y.o. Sex: male Patient is status post Procedure(s): RIGHT SHOULDER ARTHROSCOPY WITH DECOMPRESSION, MINI OPEN ROTATOR CUFF TEAR REPAIR AND BICEPS TENODESIS. Surgeon(s) and Role: * Morena Key MD - Primary Local/Dose/Irrigation:   
 
             
Peripheral IV 05/08/19 Left Hand (Active) Site Assessment Clean, dry, & intact 5/8/2019  8:17 AM  
Phlebitis Assessment 0 5/8/2019  8:17 AM  
Infiltration Assessment 0 5/8/2019  8:17 AM  
Dressing Status Clean, dry, & intact 5/8/2019  8:17 AM  
Dressing Type Transparent;Tape 5/8/2019  8:17 AM  
Hub Color/Line Status Pink; Infusing 5/8/2019  8:17 AM  
                 
 
 
 
Dressing/Packing:  Wound Shoulder Right-Dressing Type: (STERISTRIPS, ADAPTIC, 4X4, ABD TAPE) (05/08/19 1100) Splint/Cast: Wound Shoulder Right-Splint Type/Material: (SHOULDER IMMOBILIZER/SLING)] Other:

## 2019-05-09 NOTE — OP NOTES
[address]  OPERATIVE REPORT    Name:  Sae Anderson  MR#:  [id number]  :  1955  ACCOUNT #:  [de-identified]  DATE OF SERVICE:  2019      PREOPERATIVE DIAGNOSES:  1. Right shoulder impingement. 2.  Large rotator cuff tear. 3.  Biceps tendon subluxation. POSTOPERATIVE DIAGNOSES:  1. Right shoulder impingement. 2.  Large rotator cuff tear. 3.  Biceps tendon subluxation. 4.  Labral and glenohumeral degeneration. PROCEDURES PERFORMED:  1. Right shoulder arthroscopy with labral and glenohumeral debridement. 2.  Biceps tenotomy. 3.  Subacromial arthroscopic decompression. 4.  Mini open rotator cuff repair. 5.  Biceps tenodesis. SURGEON:  Fermin Nugent MD    ASSISTANT:  Susie Marshall PA-C    ANESTHESIA:  General anesthesia. COMPLICATIONS:  None. SPECIMENS REMOVED:  None. IMPLANTS:  SwiveLock anchor x5. ESTIMATED BLOOD LOSS:  minimal    FINDINGS:  Listed in operative report. INDICATIONS:  The patient recently injured his right shoulder. He has a very large tear of the rotator cuff. He also had subluxation of the biceps tendon with subscapularis tear. He now presents for the above procedures. He understands that we expect to find a quite large tear and implications of the large tear. PROCEDURE:  On the day of operation, the patient was taken to the holding area, scalene block administered. The patient was taken to the operating room and placed in the supine position. General anesthesia was administered. The patient was placed in a semi-sitting position. The right shoulder was prepped and draped in the usual fashion. Antibiotics were given and after appropriate consent and verification, a needle was placed posteriorly into the glenohumeral joint, it was insufflated with saline.   Arthroscope brought through the posterior portal.  There was marked fraying of the anterior glenoid and some mild chondromalacia of the glenohumeral joint. An anterior portal was established, a shaver was introduced and labrum debrided and the glenohumeral changes debrided. The biceps tendon was then noted to be markedly subluxed anteriorly. There was a quite large tear of the rotator cuff. It was felt that the biceps tenotomy would be appropriate and therefore it was accomplished with electrocautery. Arthroscope was then brought into the subacromial space. There was impingement about the acromion. A lateral portal was established. Electrocautery was used to denude soft tissue from the acromion. An acromioplasty was then performed and felt to adequately decompress the subacromial space. The rotator cuff was then examined noting a quite large tear with significant retraction. Saline and instruments removed. The lateral portal was extended to a small degree. There was a complex tear of the rotator cuff with significant retraction. The posterior tissue was then freed up and brought laterally and somewhat anteriorly. Both arms of three TigerTapes were brought through this tissue and brought lateral and somewhat anterior. They were secured using three SwiveLock anchors. The subscapularis tendon was then identified and brought laterally. Some anterior supraspinatus tissue was also identified. Both arms of two TigerTapes were brought through these tissues and brought laterally and somewhat posteriorly. They were then secured using two SwiveLock anchors and the anchors also provided tenodesis for the biceps tendon. Saline and instruments removed. The fascia was closed with 0 Vicryl, 2-0 Vicryl was used to close the subcutaneous tissue, skin closed in subcuticular fashion using 3-0 Prolene suture. Anterior and posterior portals were closed with Prolene as well. Sterile dressings applied. The patient was taken to the recovery room in satisfactory condition.     The assistant, Aida Malone PA-C, assisted me with positioning, retraction, implant installation, and incision closure.       Josh Giordano MD      [id number]  D:  05/08/2019 21:35  T:  05/08/2019 21:43  JOB #:  [id number]  CC:  Dinora Latham MD

## 2019-05-13 NOTE — DISCHARGE SUMMARY
Discharge Summary    Physician: Milagros Ray MD    Physician Assistant: Ed Torrez PA-C    Admit Diagnosis:  RIGHT ROTATOR CUFF TEAR    Final Diagnosis:   1. Right shoulder impingement. 2.  Large rotator cuff tear. 3.  Biceps tendon subluxation. 4.  Labral and glenohumeral degeneration.     Procedure: Right shoulder arthroscopy with labral and glenohumeral debridement, Biceps tenotomy, Subacromial arthroscopic decompression with Mini open rotator cuff repair and Biceps tenodesis. Complications: None. History of Present Illness: The patient has a history of pain within the right shoulder . The patient has a right shoulder rotator cuff tear and has exhausted conservative therapy at this time. The patient presents for the above-mentioned procedure. The patient has been cleared from a medical and cardiac standpoint. Past Medical History:  Recorded in the chart. Physical Examination: Also recorded in the chart. Examination of the right shoulder reveals elevation to 158 degrees with marked crepitus and tenderness to the subacromial space. Weakness to external rotation. No neck pain. No numbness or paresthesias in the hands. No effusion. No sign of infection. No cellulitis or rash. I detect no motor or sensory deficits in the upper extremities. The neurovascular status of the upper extremities is intact. MRI reveals 1. Full-thickness mildly retracted supraspinatus tear. No fatty muscle  atrophy. 2. Completely torn and retracted subscapularis tear. No fatty muscle  atrophy. 3. Infraspinatus tendinopathy and small laminar intrasubstance tear. 4. Thickened and medially subluxed long head of the biceps tendon. 5. Moderate glenohumeral osteoarthritis. 6. Moderate acromioclavicular degenerative changes. 7. Moderate subacromial/subdeltoid bursitis. Course in the Hospital:  The patient was admitted to the hospital.  The Pt.  Underwent a Right shoulder arthroscopy with labral and glenohumeral debridement, Biceps tenotomy, Subacromial arthroscopic decompression with Mini open rotator cuff repair and Biceps tenodesis. Postoperatively, the pt. did well. The pt. Remained stable from a medical standpoint. At the time of discharge, the patient's right shoulder incision appeared with sutures/steri-strips intact. No signs of infection or inflammation noted. The patient will follow up in our office in ten days post-operatively. DC med list:  Discharge Medication List as of 5/8/2019  1:50 PM      START taking these medications    Details   HYDROmorphone (DILAUDID) 2 mg tablet Take 1-2 Tabs by mouth every four (4) hours as needed for Pain for up to 7 days. Max Daily Amount: 24 mg., Print, Disp-36 Tab, R-0         CONTINUE these medications which have NOT CHANGED    Details   vitamin e (AQUA GEMS) 200 unit capsule Take  by mouth daily. , Historical Med      omega-3 fatty acids (FISH OIL CONCENTRATE PO) Take 1,200 Units by mouth three (3) times daily. , Historical Med      multivitamin (ONE A DAY) tablet Take 1 Tab by mouth daily. , Historical Med      acetaminophen (TYLENOL EXTRA STRENGTH PO) Take  by mouth as needed., Historical Med      naproxen sodium (ALEVE) 220 mg cap Take  by mouth as needed., Historical Med      OTHER CBD OIL; AT BEDTIME AS NEEDED; FOR SHOULDER PAIN., Historical Med             Patient Disposition: Home  Followup Care:  Discharge Condition: good  Activity as tolerated in slingshot brace  Regular Diet  As directed    Follow-up Information     Follow up With Specialties Details Why Contact Info    None    None ([phone]) Patient stated that they have no PCP      Travis Gutiérrez MD Orthopedic Surgery Schedule an appointment as soon as possible for a visit in 10 day(s)  [address] [facility] [address] [place] [address]                Leobardo Fuentes PA-C

## (undated) DEVICE — STERILE POLYISOPRENE POWDER-FREE SURGICAL GLOVES: Brand: PROTEXIS

## (undated) DEVICE — ACROMIOPLASTY ELECTRODE (ELECTRODE ONLY): Brand: CONMED

## (undated) DEVICE — INFECTION CONTROL KIT SYS

## (undated) DEVICE — SUTURE MCRYL SZ 4 0 L18IN ABSRB UD PC 5 L19MM 3 8 CIR SGL Y823G

## (undated) DEVICE — DYONICS 25 INFLOW/OUTFLOW TUBE                                    SET, SINGLE SUCTION, 3 PER BOX

## (undated) DEVICE — ARGYLE FRAZIER SURGICAL SUCTION INSTRUMENT 10 FR/CH (3.3 MM): Brand: ARGYLE

## (undated) DEVICE — STRAP POS KNEE BODY VELC

## (undated) DEVICE — DRAPE,REIN 53X77,STERILE: Brand: MEDLINE

## (undated) DEVICE — 5.5 CM ACROMIOBLASTER STRAIGHT                                    BURRS, POWER/EP-1, BRICK RED, 8000                                    MAXIMUM RPM, PACKAGED 6 PER BOX, STERILE

## (undated) DEVICE — 4-PORT MANIFOLD: Brand: NEPTUNE 2

## (undated) DEVICE — SUTURE VCRL SZ 0 L27IN ABSRB UD L36MM CT-1 1/2 CIR J260H

## (undated) DEVICE — SUTURE PROL SZ 3-0 L18IN NONABSORBABLE BLU L19MM PC-5 3/8 8632G

## (undated) DEVICE — DRAPE,EXTREMITY,89X128,STERILE: Brand: MEDLINE

## (undated) DEVICE — SOLUTION IRRIG 3000ML LAC R FLX CONT

## (undated) DEVICE — PREP SKN PREVAIL 40ML APPL --

## (undated) DEVICE — SUTURE TIGERTAPE TIGERWIRE SZ 2-0 L30IN NONABSORBABLE AR72377T

## (undated) DEVICE — SUCTION RING WITH TUBING: Brand: NEPTUNE

## (undated) DEVICE — KENDALL SCD EXPRESS SLEEVES, KNEE LENGTH, MEDIUM: Brand: KENDALL SCD

## (undated) DEVICE — SURGICAL PROCEDURE PACK BASIN MAJ SET CUST NO CAUT

## (undated) DEVICE — NEEDLE SUT PASS FOR ROT CUF LABRAL REP MULTFI SCORPION

## (undated) DEVICE — INTENDED FOR TISSUE SEPARATION, AND OTHER PROCEDURES THAT REQUIRE A SHARP SURGICAL BLADE TO PUNCTURE OR CUT.: Brand: BARD-PARKER ® CARBON RIB-BACK BLADES

## (undated) DEVICE — ARTHROSCOPY RICHMOND-LF: Brand: MEDLINE INDUSTRIES, INC.

## (undated) DEVICE — STERILE POLYISOPRENE POWDER-FREE SURGICAL GLOVES WITH EMOLLIENT COATING: Brand: PROTEXIS

## (undated) DEVICE — SPONGE GZ W4XL4IN COT 12 PLY TYP VII WVN C FLD DSGN

## (undated) DEVICE — ROCKER SWITCH PENCIL BLADE ELECTRODE, HOLSTER: Brand: EDGE

## (undated) DEVICE — 4.5 MM INCISOR PLUS STRAIGHT                                    BLADES, POWER/EP-1, VIOLET, PACKAGED                                    6 PER BOX, STERILE

## (undated) DEVICE — BANDAGE COMPR SELF ADH 5 YDX4 IN TAN STRL PREMIERPRO LF

## (undated) DEVICE — NDL SUT TAPR PT MAYO 0.5 CIR 5 --

## (undated) DEVICE — PAD,ABDOMINAL,5"X9",ST,LF,25/BX: Brand: MEDLINE INDUSTRIES, INC.

## (undated) DEVICE — STRIP,CLOSURE,WOUND,MEDI-STRIP,1/2X4: Brand: MEDLINE

## (undated) DEVICE — REM POLYHESIVE ADULT PATIENT RETURN ELECTRODE: Brand: VALLEYLAB

## (undated) DEVICE — CURITY NON-ADHERENT STRIPS: Brand: CURITY

## (undated) DEVICE — GOWN,BREATHABLE SLV,AURORA,LG,STRL: Brand: MEDLINE

## (undated) DEVICE — SUTURE VCRL SZ 2-0 L36IN ABSRB UD L36MM CT-1 1/2 CIR J945H